# Patient Record
Sex: FEMALE | Race: WHITE | HISPANIC OR LATINO | Employment: UNEMPLOYED | URBAN - METROPOLITAN AREA
[De-identification: names, ages, dates, MRNs, and addresses within clinical notes are randomized per-mention and may not be internally consistent; named-entity substitution may affect disease eponyms.]

---

## 2017-08-01 PROBLEM — M79.7 FIBROMYALGIA: Status: ACTIVE | Noted: 2017-08-01

## 2017-08-01 PROBLEM — F41.9 ANXIETY: Status: ACTIVE | Noted: 2017-08-01

## 2018-03-06 ENCOUNTER — HISTORICAL (OUTPATIENT)
Dept: RADIOLOGY | Facility: HOSPITAL | Age: 53
End: 2018-03-06

## 2022-09-21 ENCOUNTER — HOSPITAL ENCOUNTER (OUTPATIENT)
Dept: RADIOLOGY | Facility: HOSPITAL | Age: 57
Discharge: HOME | End: 2022-09-21
Attending: NURSE PRACTITIONER
Payer: MEDICARE

## 2022-09-21 DIAGNOSIS — Z12.31 BREAST CANCER SCREENING BY MAMMOGRAM: ICD-10-CM

## 2022-09-21 PROCEDURE — 77063 BREAST TOMOSYNTHESIS BI: CPT | Mod: 26,,, | Performed by: RADIOLOGY

## 2022-09-21 PROCEDURE — 77067 SCR MAMMO BI INCL CAD: CPT | Mod: TC

## 2022-09-21 PROCEDURE — 77067 SCR MAMMO BI INCL CAD: CPT | Mod: 26,,, | Performed by: RADIOLOGY

## 2022-09-21 PROCEDURE — 77063 MAMMO DIGITAL SCREENING BILAT WITH TOMO: ICD-10-PCS | Mod: 26,,, | Performed by: RADIOLOGY

## 2022-09-21 PROCEDURE — 77067 MAMMO DIGITAL SCREENING BILAT WITH TOMO: ICD-10-PCS | Mod: 26,,, | Performed by: RADIOLOGY

## 2022-10-05 ENCOUNTER — HOSPITAL ENCOUNTER (OUTPATIENT)
Dept: RADIOLOGY | Facility: HOSPITAL | Age: 57
Discharge: HOME | End: 2022-10-05
Attending: NURSE PRACTITIONER
Payer: MEDICARE

## 2022-10-05 DIAGNOSIS — R92.8 ABNORMAL MAMMOGRAM: ICD-10-CM

## 2022-10-05 PROCEDURE — 76641 ULTRASOUND BREAST COMPLETE: CPT | Mod: TC,RT

## 2022-10-05 PROCEDURE — 76641 US BREAST RIGHT COMPLETE: ICD-10-PCS | Mod: 26,RT,, | Performed by: RADIOLOGY

## 2022-10-05 PROCEDURE — 77065 MAMMO DIGITAL DIAGNOSTIC RIGHT WITH TOMO: ICD-10-PCS | Mod: 26,RT,, | Performed by: RADIOLOGY

## 2022-10-05 PROCEDURE — 77061 BREAST TOMOSYNTHESIS UNI: CPT | Mod: 26,RT,, | Performed by: RADIOLOGY

## 2022-10-05 PROCEDURE — 77065 DX MAMMO INCL CAD UNI: CPT | Mod: TC,RT

## 2022-10-05 PROCEDURE — 76641 ULTRASOUND BREAST COMPLETE: CPT | Mod: 26,RT,, | Performed by: RADIOLOGY

## 2022-10-05 PROCEDURE — 77065 DX MAMMO INCL CAD UNI: CPT | Mod: 26,RT,, | Performed by: RADIOLOGY

## 2022-10-05 PROCEDURE — 77061 MAMMO DIGITAL DIAGNOSTIC RIGHT WITH TOMO: ICD-10-PCS | Mod: 26,RT,, | Performed by: RADIOLOGY

## 2022-10-24 ENCOUNTER — HOSPITAL ENCOUNTER (OUTPATIENT)
Dept: RADIOLOGY | Facility: HOSPITAL | Age: 57
Discharge: HOME | End: 2022-10-24
Attending: NURSE PRACTITIONER
Payer: MEDICARE

## 2022-10-24 DIAGNOSIS — R92.8 ABNORMAL MAMMOGRAM: Primary | ICD-10-CM

## 2022-10-24 PROCEDURE — 19081 MAMMO BREAST STEREOTACTIC BIOPSY 1ST SITE COMPLETE: ICD-10-PCS | Mod: RT,,, | Performed by: RADIOLOGY

## 2022-10-24 PROCEDURE — 27201044 MAMMO BREAST STEREOTACTIC BIOPSY 1ST SITE COMPLETE

## 2022-10-24 PROCEDURE — 19081 BX BREAST 1ST LESION STRTCTC: CPT | Mod: RT,,, | Performed by: RADIOLOGY

## 2022-10-24 PROCEDURE — 88305 TISSUE EXAM BY PATHOLOGIST: CPT

## 2022-10-24 PROCEDURE — 27000549 MAMMO BREAST STEREOTACTIC BIOPSY 1ST SITE COMPLETE

## 2022-10-26 DIAGNOSIS — N64.89 COMPLEX SCLEROSING LESION OF RIGHT BREAST: Primary | ICD-10-CM

## 2022-10-26 LAB
ESTROGEN SERPL-MCNC: NORMAL PG/ML
INSULIN SERPL-ACNC: NORMAL U[IU]/ML
LAB AP CLINICAL INFORMATION: NORMAL
LAB AP GROSS DESCRIPTION: NORMAL
LAB AP REPORT FOOTNOTES: NORMAL
T3RU NFR SERPL: NORMAL %

## 2022-11-10 ENCOUNTER — OFFICE VISIT (OUTPATIENT)
Dept: SURGERY | Facility: CLINIC | Age: 57
End: 2022-11-10
Payer: MEDICARE

## 2022-11-10 VITALS
TEMPERATURE: 98 F | SYSTOLIC BLOOD PRESSURE: 136 MMHG | DIASTOLIC BLOOD PRESSURE: 73 MMHG | OXYGEN SATURATION: 98 % | BODY MASS INDEX: 29.02 KG/M2 | RESPIRATION RATE: 20 BRPM | HEIGHT: 63 IN | HEART RATE: 86 BPM | WEIGHT: 163.81 LBS

## 2022-11-10 DIAGNOSIS — N64.89 COMPLEX SCLEROSING LESION OF RIGHT BREAST: ICD-10-CM

## 2022-11-10 DIAGNOSIS — Z01.818 PRE-OP TESTING: Primary | ICD-10-CM

## 2022-11-10 PROCEDURE — 1159F PR MEDICATION LIST DOCUMENTED IN MEDICAL RECORD: ICD-10-PCS | Mod: CPTII,S$GLB,, | Performed by: PHYSICIAN ASSISTANT

## 2022-11-10 PROCEDURE — 4010F PR ACE/ARB THEARPY RXD/TAKEN: ICD-10-PCS | Mod: CPTII,S$GLB,, | Performed by: PHYSICIAN ASSISTANT

## 2022-11-10 PROCEDURE — 3008F BODY MASS INDEX DOCD: CPT | Mod: CPTII,S$GLB,, | Performed by: PHYSICIAN ASSISTANT

## 2022-11-10 PROCEDURE — 3008F PR BODY MASS INDEX (BMI) DOCUMENTED: ICD-10-PCS | Mod: CPTII,S$GLB,, | Performed by: PHYSICIAN ASSISTANT

## 2022-11-10 PROCEDURE — 99999 PR PBB SHADOW E&M-EST. PATIENT-LVL V: CPT | Mod: PBBFAC,,, | Performed by: PHYSICIAN ASSISTANT

## 2022-11-10 PROCEDURE — 4010F ACE/ARB THERAPY RXD/TAKEN: CPT | Mod: CPTII,S$GLB,, | Performed by: PHYSICIAN ASSISTANT

## 2022-11-10 PROCEDURE — 99205 OFFICE O/P NEW HI 60 MIN: CPT | Mod: S$GLB,,, | Performed by: PHYSICIAN ASSISTANT

## 2022-11-10 PROCEDURE — 3078F PR MOST RECENT DIASTOLIC BLOOD PRESSURE < 80 MM HG: ICD-10-PCS | Mod: CPTII,S$GLB,, | Performed by: PHYSICIAN ASSISTANT

## 2022-11-10 PROCEDURE — 99205 PR OFFICE/OUTPT VISIT, NEW, LEVL V, 60-74 MIN: ICD-10-PCS | Mod: S$GLB,,, | Performed by: PHYSICIAN ASSISTANT

## 2022-11-10 PROCEDURE — 3075F SYST BP GE 130 - 139MM HG: CPT | Mod: CPTII,S$GLB,, | Performed by: PHYSICIAN ASSISTANT

## 2022-11-10 PROCEDURE — 3075F PR MOST RECENT SYSTOLIC BLOOD PRESS GE 130-139MM HG: ICD-10-PCS | Mod: CPTII,S$GLB,, | Performed by: PHYSICIAN ASSISTANT

## 2022-11-10 PROCEDURE — 1159F MED LIST DOCD IN RCRD: CPT | Mod: CPTII,S$GLB,, | Performed by: PHYSICIAN ASSISTANT

## 2022-11-10 PROCEDURE — 3078F DIAST BP <80 MM HG: CPT | Mod: CPTII,S$GLB,, | Performed by: PHYSICIAN ASSISTANT

## 2022-11-10 PROCEDURE — 99999 PR PBB SHADOW E&M-EST. PATIENT-LVL V: ICD-10-PCS | Mod: PBBFAC,,, | Performed by: PHYSICIAN ASSISTANT

## 2022-11-10 RX ORDER — TRAZODONE HYDROCHLORIDE 50 MG/1
25 TABLET ORAL NIGHTLY
COMMUNITY
Start: 2022-09-21

## 2022-11-10 RX ORDER — BUSPIRONE HYDROCHLORIDE 5 MG/1
5 TABLET ORAL 2 TIMES DAILY
COMMUNITY
Start: 2022-10-19

## 2022-11-10 RX ORDER — PRAZOSIN HYDROCHLORIDE 1 MG/1
CAPSULE ORAL
COMMUNITY
Start: 2022-09-21 | End: 2023-10-17

## 2022-11-10 RX ORDER — IRBESARTAN 300 MG/1
300 TABLET ORAL DAILY
COMMUNITY
Start: 2022-10-11

## 2022-11-10 RX ORDER — AMLODIPINE BESYLATE 10 MG/1
10 TABLET ORAL NIGHTLY
COMMUNITY
Start: 2022-10-14 | End: 2023-10-17

## 2022-11-10 RX ORDER — SODIUM CHLORIDE, SODIUM LACTATE, POTASSIUM CHLORIDE, CALCIUM CHLORIDE 600; 310; 30; 20 MG/100ML; MG/100ML; MG/100ML; MG/100ML
INJECTION, SOLUTION INTRAVENOUS CONTINUOUS
Status: CANCELLED | OUTPATIENT
Start: 2022-11-10

## 2022-11-10 NOTE — PROGRESS NOTES
[facility] Breast Surg  Breast Surgical Oncology  New Patient Office Visit - H&P    Referring Provider: Dr. Ilda Alexis  PCP: Tor Arango MD     Chief Complaint:   Chief Complaint   Patient presents with    Breast Mass     Patient referred for complex sclerosing lesion right breast, mild soreness after biopsy done on 10/24/22, no skin discoloration, no nipple discharge      Subjective:     HPI:  Elise Marshall is a 57 y.o. female who presents on 11/10/2022 for evaluation of  a complex sclerosing lesion of the right breast .    She currently denies any breast issues including rashes, redness, pain, swelling, nipple discharge, or new lumps/masses. She has a history of a benign right breast needle biopsy and lumpectomy in  at [facility].     Imagin2022 SCR MG - BIRADS 0: INCOMPLETE: The focal asymmetry with associated calcifications in the right breast which needs further evaluation.      10/5/2022 R DG MG and R Breast US - BIRADS 4: SUSPICIOUS: In the subareolar right breast, anterior to middle depth, there is a persistent focal asymmetry measuring up to 49 mm.  There are associated amorphous calcifications in the subareolar right breast, anterior to middle depth, which span 59 mm anterior to posterior.  These calcifications extend anteriorly to the base of the nipple.  These findings correspond to those recalled on the screening examination dated 2021.  In the 6:00 to 7:00 subareolar right breast, there is a focal change of echogenicity measuring up to 40 mm.  This corresponds in part to the mammographic finding. Right breast tomosynthesis/stereotactic guided biopsy is recommended.    Pathology:   10/26/2022 Right Breast Subareolar focal asymmetry with calcifications: Complex Sclerosing Lesion with microcalcifications and mild duct epithelial hyperplasia, usual type    OB/GYN History:  Age at Menarche Onset: 18  Menopausal Status: postmenopausal, LMP: Patient's last  menstrual period was 2016.  Hysterectomy/Oophorectomy: menopause, at age 52  Hormonal birth control (duration): 13 years  Pregnancy History:   Age at first live birth: 23  Hormone Replacement Therapy: No, none    Other:  # of breast biopsies (when and pathology results): 1, benign in 2007  MG breast density: No breast composition recorded.   Prior thoracic RT: none  Genetic testing: none  Ashkenazi Roman Catholic descent: No    Family History:  Family History   Problem Relation Age of Onset    Diabetes Mother     Hypertension Mother     Heart disease Mother     No Known Problems Father     Hypertension Brother     Hypertension Brother     Single kidney Brother     Mental illness Brother         Patient History:  Past Medical History:   Diagnosis Date    Anxiety     Arthritis     Depression     Fibromyalgia     GERD (gastroesophageal reflux disease)     Hyperlipidemia     Hypertension        Body mass index is 29.02 kg/m².     Past Surgical History:   Procedure Laterality Date    BREAST SURGERY Right      SECTION      TUBAL LIGATION      URETERAL STENT PLACEMENT         Social History     Socioeconomic History    Marital status: Single    Years of education: 12TH   Tobacco Use    Smoking status: Never    Smokeless tobacco: Never   Substance and Sexual Activity    Alcohol use: Yes     Comment: occ    Drug use: No    Sexual activity: Never         There is no immunization history on file for this patient.    Medications/Allergies:    Current Outpatient Medications:     ALPRAZolam (XANAX) 0.5 MG tablet, , Disp: , Rfl: 2    amLODIPine (NORVASC) 10 MG tablet, Take 10 mg by mouth every evening., Disp: , Rfl:     busPIRone (BUSPAR) 5 MG Tab, Take 5 mg by mouth 2 (two) times daily., Disp: , Rfl:     dextroamphetamine-amphetamine (ADDERALL XR) 15 MG 24 hr capsule, , Disp: , Rfl:     duloxetine (CYMBALTA) 30 MG capsule, Take 30 mg by mouth once daily. In addition to 60 mg (total 90 mg), Disp: , Rfl:     duloxetine  (CYMBALTA) 60 MG capsule, Take 60 mg by mouth once daily., Disp: , Rfl:     irbesartan (AVAPRO) 300 MG tablet, Take 300 mg by mouth once daily., Disp: , Rfl:     oxybutynin (DITROPAN-XL) 5 MG TR24, TAKE 1 TABLET BY MOUTH EVERY DAY, Disp: 30 tablet, Rfl: 0    pantoprazole (PROTONIX) 40 MG tablet, TK 1 T PO  QAM 30 MINUTES PRIOR TO BREAKFAST, Disp: , Rfl: 10    prazosin (MINIPRESS) 1 MG Cap, prn, Disp: , Rfl:     traZODone (DESYREL) 50 MG tablet, Take 25 mg by mouth every evening., Disp: , Rfl:      Review of patient's allergies indicates:   Allergen Reactions    Diclofenac Anaphylaxis       Review of Systems:  Review of Systems   Constitutional:  Positive for malaise/fatigue. Negative for chills, fever and weight loss.        Chronic, fibromyalgia     HENT:  Negative for congestion and sore throat.    Eyes:  Negative for blurred vision and double vision.   Respiratory:  Negative for cough, hemoptysis, shortness of breath and wheezing.    Cardiovascular:  Negative for chest pain, palpitations and leg swelling.   Gastrointestinal:  Positive for heartburn. Negative for abdominal pain, constipation, diarrhea, nausea and vomiting.   Genitourinary:  Positive for frequency. Negative for dysuria and hematuria.        Chronic - possible interstitial cystitis vs OAB   Musculoskeletal:  Positive for joint pain. Negative for falls and myalgias.        Chronic, fibromyalgia   Skin:  Negative for itching and rash.   Neurological:  Negative for dizziness, sensory change, focal weakness, seizures and headaches.   Endo/Heme/Allergies:  Negative for environmental allergies. Does not bruise/bleed easily.   Psychiatric/Behavioral:  Positive for depression. The patient is nervous/anxious.         Objective:     Vitals:  Vitals:    11/10/22 1417   BP: 136/73   Pulse: 86   Resp: 20   Temp: 97.9 °F (36.6 °C)       Physical Exam:  General: The patient is awake, alert and oriented times three. The patient is well nourished and in no acute  distress.  Neck: There is no evidence of palpable cervical, supraclavicular or axillary adenopathy. The neck is supple. The thyroid is not enlarged.  Musculoskeletal: The patient has a normal range of motion of her bilateral upper extremities.  Chest: Examination of the chest wall fails to reveal any obvious abnormalities. Nonlabored breathing, symmetric expansion.  Breast:  Right: Examination of right breast fails to reveal any dominant masses or areas of significant focal nodularity. The nipple is everted without evidence of discharge. There is no skin dimpling with movement of the pectoralis. There are no significant skin changes overlying the breast.   Left: Indention in the 6:00 position 1 cm from the areola related to scarring from needle insertion site during biopsy. Examination of the left breast fails to reveal any dominant masses or areas of significant focal nodularity. The nipple is everted without evidence of discharge. There is no skin dimpling with movement of the pectoralis. There are no significant skin changes overlying the breast.  Abdomen: The abdomen is soft, flat, nontender and nondistended.  Integumentary: no rashes or skin lesions present  Neurologic: cranial nerves intact, no signs of peripheral neurological deficit, motor/sensory function intact      Assessment and Plan:         Patient Active Problem List   Diagnosis    OAB (overactive bladder)    Hydronephrosis, right    Fibromyalgia    Anxiety        Elise was seen today for breast mass.    Diagnoses and all orders for this visit:    Complex sclerosing lesion of right breast  -     Ambulatory referral/consult to Breast Surgery     Radial scars, also called complex sclerosing lesions, are a pathologic diagnosis, usually discovered incidentally when a breast mass or radiologic abnormality is removed or biopsied. Occasionally, radial scars are large enough to be detected on mammography as suspicious spiculated masses, which cannot be  "reliably differentiated from spiculated carcinoma by imaging alone. Radial scars are characterized microscopically by a fibroelastic core with radiating ducts and lobules.    In general, surgical excision is recommended when radial scars or complex sclerosing lesions are diagnosed on core biopsy, based on series showing that 8 to 17 percent of surgical specimens at subsequent excision are positive for malignancy. The current recommendations from the American Society of Breast Surgeons state that most radial scars "should be excised, although imaging follow-up is reasonable for small, image-detected radial scars that are completely removed or well-sampled with large-gauge devices and in the setting of imaging-pathology concordance".    No additional treatment beyond excision is needed for radial scars. The risk of subsequent breast cancer after excision in this population is small, and chemoprevention is not indicated.      Plan:     Surgery recommended: Right Breast Excisional Biopsy with Seed Localization, tentatively 12/2/2022   - Pre-op Testing: CBC, BMP, EKG   - Surgical and social distancing guidelines discussed.          All of her questions were answered. She was advised to call if she develops any questions or concerns.    Nory Brito PA-C          --------------------------------------------------------------------------------------------------------------  Total time on the date of the visit ranged from 60-74 mins (85984). Total time includes both face-to-face and non-face-to-face time personally spent by myself on the day of the visit.    Non-face-to-face time included:  _X_ preparing to see the patient such as reviewing the patient record  _X_ obtaining and reviewing separately obtained history  _X_ independently interpreting results  _X_ documenting clinical information in electronic health record.    Face-to-face time included:  _X_ performing an appropriate history and examination  _X_ " communicating results to the patient  _X_ counseling and educating the patient  _x_ ordering appropriate medications  _x_ ordering appropriate tests  _X_ ordering appropriate procedures (including follow-up)  _X_ answering any questions the patient had    Total Time spent on date of visit: 60 minutes

## 2022-11-10 NOTE — H&P (VIEW-ONLY)
[facility] Breast Surg  Breast Surgical Oncology  New Patient Office Visit - H&P    Referring Provider: Dr. Ilda Alexis  PCP: Tor Arango MD     Chief Complaint:   Chief Complaint   Patient presents with    Breast Mass     Patient referred for complex sclerosing lesion right breast, mild soreness after biopsy done on 10/24/22, no skin discoloration, no nipple discharge      Subjective:     HPI:  Elise Marshall is a 57 y.o. female who presents on 11/10/2022 for evaluation of  a complex sclerosing lesion of the right breast .    She currently denies any breast issues including rashes, redness, pain, swelling, nipple discharge, or new lumps/masses. She has a history of a benign right breast needle biopsy and lumpectomy in  at [facility].     Imagin2022 SCR MG - BIRADS 0: INCOMPLETE: The focal asymmetry with associated calcifications in the right breast which needs further evaluation.      10/5/2022 R DG MG and R Breast US - BIRADS 4: SUSPICIOUS: In the subareolar right breast, anterior to middle depth, there is a persistent focal asymmetry measuring up to 49 mm.  There are associated amorphous calcifications in the subareolar right breast, anterior to middle depth, which span 59 mm anterior to posterior.  These calcifications extend anteriorly to the base of the nipple.  These findings correspond to those recalled on the screening examination dated 2021.  In the 6:00 to 7:00 subareolar right breast, there is a focal change of echogenicity measuring up to 40 mm.  This corresponds in part to the mammographic finding. Right breast tomosynthesis/stereotactic guided biopsy is recommended.    Pathology:   10/26/2022 Right Breast Subareolar focal asymmetry with calcifications: Complex Sclerosing Lesion with microcalcifications and mild duct epithelial hyperplasia, usual type    OB/GYN History:  Age at Menarche Onset: 18  Menopausal Status: postmenopausal, LMP: Patient's last  menstrual period was 2016.  Hysterectomy/Oophorectomy: menopause, at age 52  Hormonal birth control (duration): 13 years  Pregnancy History:   Age at first live birth: 23  Hormone Replacement Therapy: No, none    Other:  # of breast biopsies (when and pathology results): 1, benign in 2007  MG breast density: No breast composition recorded.   Prior thoracic RT: none  Genetic testing: none  Ashkenazi Anglican descent: No    Family History:  Family History   Problem Relation Age of Onset    Diabetes Mother     Hypertension Mother     Heart disease Mother     No Known Problems Father     Hypertension Brother     Hypertension Brother     Single kidney Brother     Mental illness Brother         Patient History:  Past Medical History:   Diagnosis Date    Anxiety     Arthritis     Depression     Fibromyalgia     GERD (gastroesophageal reflux disease)     Hyperlipidemia     Hypertension        Body mass index is 29.02 kg/m².     Past Surgical History:   Procedure Laterality Date    BREAST SURGERY Right      SECTION      TUBAL LIGATION      URETERAL STENT PLACEMENT         Social History     Socioeconomic History    Marital status: Single    Years of education: 12TH   Tobacco Use    Smoking status: Never    Smokeless tobacco: Never   Substance and Sexual Activity    Alcohol use: Yes     Comment: occ    Drug use: No    Sexual activity: Never         There is no immunization history on file for this patient.    Medications/Allergies:    Current Outpatient Medications:     ALPRAZolam (XANAX) 0.5 MG tablet, , Disp: , Rfl: 2    amLODIPine (NORVASC) 10 MG tablet, Take 10 mg by mouth every evening., Disp: , Rfl:     busPIRone (BUSPAR) 5 MG Tab, Take 5 mg by mouth 2 (two) times daily., Disp: , Rfl:     dextroamphetamine-amphetamine (ADDERALL XR) 15 MG 24 hr capsule, , Disp: , Rfl:     duloxetine (CYMBALTA) 30 MG capsule, Take 30 mg by mouth once daily. In addition to 60 mg (total 90 mg), Disp: , Rfl:     duloxetine  (CYMBALTA) 60 MG capsule, Take 60 mg by mouth once daily., Disp: , Rfl:     irbesartan (AVAPRO) 300 MG tablet, Take 300 mg by mouth once daily., Disp: , Rfl:     oxybutynin (DITROPAN-XL) 5 MG TR24, TAKE 1 TABLET BY MOUTH EVERY DAY, Disp: 30 tablet, Rfl: 0    pantoprazole (PROTONIX) 40 MG tablet, TK 1 T PO  QAM 30 MINUTES PRIOR TO BREAKFAST, Disp: , Rfl: 10    prazosin (MINIPRESS) 1 MG Cap, prn, Disp: , Rfl:     traZODone (DESYREL) 50 MG tablet, Take 25 mg by mouth every evening., Disp: , Rfl:      Review of patient's allergies indicates:   Allergen Reactions    Diclofenac Anaphylaxis       Review of Systems:  Review of Systems   Constitutional:  Positive for malaise/fatigue. Negative for chills, fever and weight loss.        Chronic, fibromyalgia     HENT:  Negative for congestion and sore throat.    Eyes:  Negative for blurred vision and double vision.   Respiratory:  Negative for cough, hemoptysis, shortness of breath and wheezing.    Cardiovascular:  Negative for chest pain, palpitations and leg swelling.   Gastrointestinal:  Positive for heartburn. Negative for abdominal pain, constipation, diarrhea, nausea and vomiting.   Genitourinary:  Positive for frequency. Negative for dysuria and hematuria.        Chronic - possible interstitial cystitis vs OAB   Musculoskeletal:  Positive for joint pain. Negative for falls and myalgias.        Chronic, fibromyalgia   Skin:  Negative for itching and rash.   Neurological:  Negative for dizziness, sensory change, focal weakness, seizures and headaches.   Endo/Heme/Allergies:  Negative for environmental allergies. Does not bruise/bleed easily.   Psychiatric/Behavioral:  Positive for depression. The patient is nervous/anxious.         Objective:     Vitals:  Vitals:    11/10/22 1417   BP: 136/73   Pulse: 86   Resp: 20   Temp: 97.9 °F (36.6 °C)       Physical Exam:  General: The patient is awake, alert and oriented times three. The patient is well nourished and in no acute  distress.  Neck: There is no evidence of palpable cervical, supraclavicular or axillary adenopathy. The neck is supple. The thyroid is not enlarged.  Musculoskeletal: The patient has a normal range of motion of her bilateral upper extremities.  Chest: Examination of the chest wall fails to reveal any obvious abnormalities. Nonlabored breathing, symmetric expansion.  Breast:  Right: Examination of right breast fails to reveal any dominant masses or areas of significant focal nodularity. The nipple is everted without evidence of discharge. There is no skin dimpling with movement of the pectoralis. There are no significant skin changes overlying the breast.   Left: Indention in the 6:00 position 1 cm from the areola related to scarring from needle insertion site during biopsy. Examination of the left breast fails to reveal any dominant masses or areas of significant focal nodularity. The nipple is everted without evidence of discharge. There is no skin dimpling with movement of the pectoralis. There are no significant skin changes overlying the breast.  Abdomen: The abdomen is soft, flat, nontender and nondistended.  Integumentary: no rashes or skin lesions present  Neurologic: cranial nerves intact, no signs of peripheral neurological deficit, motor/sensory function intact      Assessment and Plan:         Patient Active Problem List   Diagnosis    OAB (overactive bladder)    Hydronephrosis, right    Fibromyalgia    Anxiety        Elise was seen today for breast mass.    Diagnoses and all orders for this visit:    Complex sclerosing lesion of right breast  -     Ambulatory referral/consult to Breast Surgery     Radial scars, also called complex sclerosing lesions, are a pathologic diagnosis, usually discovered incidentally when a breast mass or radiologic abnormality is removed or biopsied. Occasionally, radial scars are large enough to be detected on mammography as suspicious spiculated masses, which cannot be  "reliably differentiated from spiculated carcinoma by imaging alone. Radial scars are characterized microscopically by a fibroelastic core with radiating ducts and lobules.    In general, surgical excision is recommended when radial scars or complex sclerosing lesions are diagnosed on core biopsy, based on series showing that 8 to 17 percent of surgical specimens at subsequent excision are positive for malignancy. The current recommendations from the American Society of Breast Surgeons state that most radial scars "should be excised, although imaging follow-up is reasonable for small, image-detected radial scars that are completely removed or well-sampled with large-gauge devices and in the setting of imaging-pathology concordance".    No additional treatment beyond excision is needed for radial scars. The risk of subsequent breast cancer after excision in this population is small, and chemoprevention is not indicated.      Plan:     Surgery recommended: Right Breast Excisional Biopsy with Seed Localization, tentatively 12/2/2022   - Pre-op Testing: CBC, BMP, EKG   - Surgical and social distancing guidelines discussed.          All of her questions were answered. She was advised to call if she develops any questions or concerns.    Nory Brito PA-C          --------------------------------------------------------------------------------------------------------------  Total time on the date of the visit ranged from 60-74 mins (69766). Total time includes both face-to-face and non-face-to-face time personally spent by myself on the day of the visit.    Non-face-to-face time included:  _X_ preparing to see the patient such as reviewing the patient record  _X_ obtaining and reviewing separately obtained history  _X_ independently interpreting results  _X_ documenting clinical information in electronic health record.    Face-to-face time included:  _X_ performing an appropriate history and examination  _X_ " communicating results to the patient  _X_ counseling and educating the patient  _x_ ordering appropriate medications  _x_ ordering appropriate tests  _X_ ordering appropriate procedures (including follow-up)  _X_ answering any questions the patient had    Total Time spent on date of visit: 60 minutes

## 2022-11-30 ENCOUNTER — HOSPITAL ENCOUNTER (OUTPATIENT)
Dept: RADIOLOGY | Facility: HOSPITAL | Age: 57
Discharge: HOME | End: 2022-11-30
Attending: SURGERY
Payer: MEDICARE

## 2022-11-30 DIAGNOSIS — N64.89 COMPLEX SCLEROSING LESION OF RIGHT BREAST: ICD-10-CM

## 2022-11-30 PROCEDURE — 19285 US RIGHT MAGSEED LOCALIZATION: ICD-10-PCS | Mod: RT,,, | Performed by: RADIOLOGY

## 2022-11-30 PROCEDURE — 77061 MAMMO DIGITAL DIAGNOSTIC RIGHT WITH TOMO: ICD-10-PCS | Mod: 26,RT,, | Performed by: RADIOLOGY

## 2022-11-30 PROCEDURE — 77065 MAMMO DIGITAL DIAGNOSTIC RIGHT WITH TOMO: ICD-10-PCS | Mod: 26,RT,, | Performed by: RADIOLOGY

## 2022-11-30 PROCEDURE — A4648 IMPLANTABLE TISSUE MARKER: HCPCS

## 2022-11-30 PROCEDURE — 77065 DX MAMMO INCL CAD UNI: CPT | Mod: 26,RT,, | Performed by: RADIOLOGY

## 2022-11-30 PROCEDURE — 77061 BREAST TOMOSYNTHESIS UNI: CPT | Mod: 26,RT,, | Performed by: RADIOLOGY

## 2022-11-30 PROCEDURE — 77065 DX MAMMO INCL CAD UNI: CPT | Mod: TC,RT

## 2022-11-30 PROCEDURE — 19285 PERQ DEV BREAST 1ST US IMAG: CPT | Mod: RT

## 2022-11-30 PROCEDURE — 19285 PERQ DEV BREAST 1ST US IMAG: CPT | Mod: RT,,, | Performed by: RADIOLOGY

## 2022-12-02 ENCOUNTER — HOSPITAL ENCOUNTER (OUTPATIENT)
Dept: RADIOLOGY | Facility: HOSPITAL | Age: 57
Discharge: HOME | End: 2022-12-02
Attending: SURGERY
Payer: MEDICARE

## 2022-12-02 ENCOUNTER — ANESTHESIA (OUTPATIENT)
Dept: SURGERY | Facility: HOSPITAL | Age: 57
End: 2022-12-02
Payer: MEDICARE

## 2022-12-02 ENCOUNTER — ANESTHESIA EVENT (OUTPATIENT)
Dept: SURGERY | Facility: HOSPITAL | Age: 57
End: 2022-12-02
Payer: MEDICARE

## 2022-12-02 ENCOUNTER — HOSPITAL ENCOUNTER (OUTPATIENT)
Facility: HOSPITAL | Age: 57
Discharge: HOME | End: 2022-12-02
Attending: SURGERY | Admitting: SURGERY
Payer: MEDICARE

## 2022-12-02 DIAGNOSIS — Z98.890 S/P LUMPECTOMY OF BREAST: Primary | ICD-10-CM

## 2022-12-02 DIAGNOSIS — N64.89 COMPLEX SCLEROSING LESION OF RIGHT BREAST: ICD-10-CM

## 2022-12-02 DIAGNOSIS — R92.8 ABNORMAL MAMMOGRAM: ICD-10-CM

## 2022-12-02 PROCEDURE — 63600175 PHARM REV CODE 636 W HCPCS: Performed by: PHYSICIAN ASSISTANT

## 2022-12-02 PROCEDURE — 63600175 PHARM REV CODE 636 W HCPCS: Performed by: SURGERY

## 2022-12-02 PROCEDURE — C1819 TISSUE LOCALIZATION-EXCISION: HCPCS | Performed by: SURGERY

## 2022-12-02 PROCEDURE — 63600175 PHARM REV CODE 636 W HCPCS: Performed by: STUDENT IN AN ORGANIZED HEALTH CARE EDUCATION/TRAINING PROGRAM

## 2022-12-02 PROCEDURE — 76098 X-RAY EXAM SURGICAL SPECIMEN: CPT | Mod: TC

## 2022-12-02 PROCEDURE — 25000003 PHARM REV CODE 250: Performed by: NURSE ANESTHETIST, CERTIFIED REGISTERED

## 2022-12-02 PROCEDURE — 88307 TISSUE EXAM BY PATHOLOGIST: CPT | Performed by: SURGERY

## 2022-12-02 PROCEDURE — 71000033 HC RECOVERY, INTIAL HOUR: Performed by: SURGERY

## 2022-12-02 PROCEDURE — 76098 MAMMO BREAST SPECIMEN: ICD-10-PCS | Mod: 26,,, | Performed by: STUDENT IN AN ORGANIZED HEALTH CARE EDUCATION/TRAINING PROGRAM

## 2022-12-02 PROCEDURE — 63600175 PHARM REV CODE 636 W HCPCS: Performed by: NURSE ANESTHETIST, CERTIFIED REGISTERED

## 2022-12-02 PROCEDURE — 27201423 OPTIME MED/SURG SUP & DEVICES STERILE SUPPLY: Performed by: SURGERY

## 2022-12-02 PROCEDURE — A4216 STERILE WATER/SALINE, 10 ML: HCPCS | Performed by: SURGERY

## 2022-12-02 PROCEDURE — 37000009 HC ANESTHESIA EA ADD 15 MINS: Performed by: SURGERY

## 2022-12-02 PROCEDURE — 25000003 PHARM REV CODE 250: Performed by: SURGERY

## 2022-12-02 PROCEDURE — C1886 CATHETER, ABLATION: HCPCS | Performed by: SURGERY

## 2022-12-02 PROCEDURE — 36000707: Performed by: SURGERY

## 2022-12-02 PROCEDURE — 71000015 HC POSTOP RECOV 1ST HR: Performed by: SURGERY

## 2022-12-02 PROCEDURE — 19125 PR EXCISE BREAST LES W XRAY MARKER: ICD-10-PCS | Mod: RT,,, | Performed by: SURGERY

## 2022-12-02 PROCEDURE — 76098 X-RAY EXAM SURGICAL SPECIMEN: CPT | Mod: 26,,, | Performed by: STUDENT IN AN ORGANIZED HEALTH CARE EDUCATION/TRAINING PROGRAM

## 2022-12-02 PROCEDURE — 36000706: Performed by: SURGERY

## 2022-12-02 PROCEDURE — 19125 EXCISION BREAST LESION: CPT | Mod: RT,,, | Performed by: SURGERY

## 2022-12-02 PROCEDURE — 37000008 HC ANESTHESIA 1ST 15 MINUTES: Performed by: SURGERY

## 2022-12-02 RX ORDER — SODIUM CHLORIDE 0.9 % (FLUSH) 0.9 %
SYRINGE (ML) INJECTION
Status: DISCONTINUED | OUTPATIENT
Start: 2022-12-02 | End: 2022-12-02 | Stop reason: HOSPADM

## 2022-12-02 RX ORDER — DIPHENHYDRAMINE HYDROCHLORIDE 50 MG/ML
25 INJECTION INTRAMUSCULAR; INTRAVENOUS EVERY 6 HOURS PRN
Status: DISCONTINUED | OUTPATIENT
Start: 2022-12-02 | End: 2022-12-02 | Stop reason: HOSPADM

## 2022-12-02 RX ORDER — ONDANSETRON HYDROCHLORIDE 2 MG/ML
INJECTION, SOLUTION INTRAMUSCULAR; INTRAVENOUS
Status: DISCONTINUED | OUTPATIENT
Start: 2022-12-02 | End: 2022-12-02

## 2022-12-02 RX ORDER — TRAMADOL HYDROCHLORIDE 50 MG/1
50 TABLET ORAL EVERY 4 HOURS PRN
Status: DISCONTINUED | OUTPATIENT
Start: 2022-12-02 | End: 2022-12-02 | Stop reason: HOSPADM

## 2022-12-02 RX ORDER — EPHEDRINE SULFATE 50 MG/ML
INJECTION, SOLUTION INTRAVENOUS
Status: DISCONTINUED | OUTPATIENT
Start: 2022-12-02 | End: 2022-12-02

## 2022-12-02 RX ORDER — MIDAZOLAM HYDROCHLORIDE 1 MG/ML
2 INJECTION INTRAMUSCULAR; INTRAVENOUS ONCE
Status: COMPLETED | OUTPATIENT
Start: 2022-12-02 | End: 2022-12-02

## 2022-12-02 RX ORDER — SODIUM CHLORIDE 9 MG/ML
INJECTION, SOLUTION INTRAMUSCULAR; INTRAVENOUS; SUBCUTANEOUS
Status: DISCONTINUED
Start: 2022-12-02 | End: 2022-12-02 | Stop reason: HOSPADM

## 2022-12-02 RX ORDER — CEFAZOLIN SODIUM 1 G/3ML
INJECTION, POWDER, FOR SOLUTION INTRAMUSCULAR; INTRAVENOUS
Status: DISCONTINUED | OUTPATIENT
Start: 2022-12-02 | End: 2022-12-02 | Stop reason: HOSPADM

## 2022-12-02 RX ORDER — BUPIVACAINE HYDROCHLORIDE 5 MG/ML
INJECTION, SOLUTION EPIDURAL; INTRACAUDAL
Status: DISCONTINUED | OUTPATIENT
Start: 2022-12-02 | End: 2022-12-02 | Stop reason: HOSPADM

## 2022-12-02 RX ORDER — CEFAZOLIN 2 G/1
INJECTION, POWDER, FOR SOLUTION INTRAMUSCULAR; INTRAVENOUS
Status: DISCONTINUED
Start: 2022-12-02 | End: 2022-12-02 | Stop reason: HOSPADM

## 2022-12-02 RX ORDER — TRAMADOL HYDROCHLORIDE 50 MG/1
50 TABLET ORAL EVERY 6 HOURS PRN
Qty: 28 TABLET | Refills: 0 | Status: SHIPPED | OUTPATIENT
Start: 2022-12-02 | End: 2022-12-09

## 2022-12-02 RX ORDER — CEFAZOLIN SODIUM 1 G/3ML
INJECTION, POWDER, FOR SOLUTION INTRAMUSCULAR; INTRAVENOUS
Status: DISCONTINUED
Start: 2022-12-02 | End: 2022-12-02 | Stop reason: HOSPADM

## 2022-12-02 RX ORDER — FENTANYL CITRATE 50 UG/ML
INJECTION, SOLUTION INTRAMUSCULAR; INTRAVENOUS
Status: DISCONTINUED | OUTPATIENT
Start: 2022-12-02 | End: 2022-12-02

## 2022-12-02 RX ORDER — PROCHLORPERAZINE EDISYLATE 5 MG/ML
5 INJECTION INTRAMUSCULAR; INTRAVENOUS EVERY 30 MIN PRN
Status: DISCONTINUED | OUTPATIENT
Start: 2022-12-02 | End: 2022-12-02 | Stop reason: HOSPADM

## 2022-12-02 RX ORDER — METOCLOPRAMIDE HYDROCHLORIDE 5 MG/ML
10 INJECTION INTRAMUSCULAR; INTRAVENOUS EVERY 10 MIN PRN
Status: DISCONTINUED | OUTPATIENT
Start: 2022-12-02 | End: 2022-12-02 | Stop reason: HOSPADM

## 2022-12-02 RX ORDER — CEFAZOLIN SODIUM 2 G/50ML
2 SOLUTION INTRAVENOUS
Status: COMPLETED | OUTPATIENT
Start: 2022-12-02 | End: 2022-12-02

## 2022-12-02 RX ORDER — PROPOFOL 10 MG/ML
VIAL (ML) INTRAVENOUS
Status: DISCONTINUED | OUTPATIENT
Start: 2022-12-02 | End: 2022-12-02

## 2022-12-02 RX ORDER — SODIUM CHLORIDE, SODIUM LACTATE, POTASSIUM CHLORIDE, CALCIUM CHLORIDE 600; 310; 30; 20 MG/100ML; MG/100ML; MG/100ML; MG/100ML
INJECTION, SOLUTION INTRAVENOUS CONTINUOUS
Status: DISCONTINUED | OUTPATIENT
Start: 2022-12-02 | End: 2022-12-02 | Stop reason: HOSPADM

## 2022-12-02 RX ORDER — ONDANSETRON 2 MG/ML
4 INJECTION INTRAMUSCULAR; INTRAVENOUS EVERY 12 HOURS PRN
Status: DISCONTINUED | OUTPATIENT
Start: 2022-12-02 | End: 2022-12-02 | Stop reason: HOSPADM

## 2022-12-02 RX ORDER — DEXAMETHASONE SODIUM PHOSPHATE 4 MG/ML
INJECTION, SOLUTION INTRA-ARTICULAR; INTRALESIONAL; INTRAMUSCULAR; INTRAVENOUS; SOFT TISSUE
Status: DISCONTINUED | OUTPATIENT
Start: 2022-12-02 | End: 2022-12-02

## 2022-12-02 RX ORDER — HYDROMORPHONE HYDROCHLORIDE 2 MG/ML
0.4 INJECTION, SOLUTION INTRAMUSCULAR; INTRAVENOUS; SUBCUTANEOUS EVERY 10 MIN PRN
Status: DISCONTINUED | OUTPATIENT
Start: 2022-12-02 | End: 2022-12-02 | Stop reason: HOSPADM

## 2022-12-02 RX ORDER — ACETAMINOPHEN 10 MG/ML
INJECTION, SOLUTION INTRAVENOUS
Status: DISCONTINUED | OUTPATIENT
Start: 2022-12-02 | End: 2022-12-02

## 2022-12-02 RX ORDER — MEPERIDINE HYDROCHLORIDE 25 MG/ML
12.5 INJECTION INTRAMUSCULAR; INTRAVENOUS; SUBCUTANEOUS EVERY 10 MIN PRN
Status: DISCONTINUED | OUTPATIENT
Start: 2022-12-02 | End: 2022-12-02 | Stop reason: HOSPADM

## 2022-12-02 RX ORDER — BUPIVACAINE HYDROCHLORIDE 5 MG/ML
INJECTION, SOLUTION EPIDURAL; INTRACAUDAL
Status: DISCONTINUED
Start: 2022-12-02 | End: 2022-12-02 | Stop reason: HOSPADM

## 2022-12-02 RX ADMIN — PROPOFOL 150 MG: 10 INJECTION, EMULSION INTRAVENOUS at 08:12

## 2022-12-02 RX ADMIN — EPHEDRINE SULFATE 20 MG: 50 INJECTION INTRAVENOUS at 09:12

## 2022-12-02 RX ADMIN — EPHEDRINE SULFATE 25 MG: 50 INJECTION INTRAVENOUS at 09:12

## 2022-12-02 RX ADMIN — ACETAMINOPHEN 1000 MG: 10 INJECTION, SOLUTION INTRAVENOUS at 09:12

## 2022-12-02 RX ADMIN — CEFAZOLIN SODIUM 2 G: 2 SOLUTION INTRAVENOUS at 08:12

## 2022-12-02 RX ADMIN — FENTANYL CITRATE 50 MCG: 50 INJECTION, SOLUTION INTRAMUSCULAR; INTRAVENOUS at 08:12

## 2022-12-02 RX ADMIN — SODIUM CHLORIDE, POTASSIUM CHLORIDE, SODIUM LACTATE AND CALCIUM CHLORIDE: 600; 310; 30; 20 INJECTION, SOLUTION INTRAVENOUS at 08:12

## 2022-12-02 RX ADMIN — EPHEDRINE SULFATE 10 MG: 50 INJECTION INTRAVENOUS at 09:12

## 2022-12-02 RX ADMIN — ONDANSETRON 4 MG: 2 INJECTION INTRAMUSCULAR; INTRAVENOUS at 10:12

## 2022-12-02 RX ADMIN — MIDAZOLAM HYDROCHLORIDE 2 MG: 1 INJECTION, SOLUTION INTRAMUSCULAR; INTRAVENOUS at 08:12

## 2022-12-02 RX ADMIN — DEXAMETHASONE SODIUM PHOSPHATE 4 MG: 4 INJECTION, SOLUTION INTRA-ARTICULAR; INTRALESIONAL; INTRAMUSCULAR; INTRAVENOUS; SOFT TISSUE at 09:12

## 2022-12-02 NOTE — OP NOTE
DATE OF PROCEDURE: 12/2/2022    SURGEON: Deya Lu M.D.    ASSISTANT:  Nory Brito PA-C    Medical Student: Mayra Winn MS-3    PREOPERATIVE DIAGNOSIS: Complex sclerosing lesion of right breast [N64.89]     POSTOPERATIVE DIAGNOSIS: Post-Op Diagnosis Codes:     * Complex sclerosing lesion of right breast [N64.89]     ANESTHESIA: General Anesthesiologist: Marck Ruiz MD  CRNA: Valerie Emmanuel CRNA     PROCEDURES PERFORMED:   1. Right breast excisional biopsy with MagSeed localization     EXCISION,MASS,BREAST,USING RADIOLOGICAL MARKER  Right:        PROCEDURE IN DETAIL:   Elise Marshall is a 57 y.o. female brought to the operating room for definitive surgical management of recent core biopsy revealing right breast complex sclerosing lesion. The patient has elected to undergo excisional biopsy for further evaluation. The patient was informed of the possible risks and complications of the procedure, including but not limited to anesthetic risks, bleeding, infection, and need for additional surgery. The patient concurred with the proposed plan, and has given informed consent. The site of surgery was properly noted/marked in the preoperative holding area.    The patient underwent informed consent. The MagSeed was placed in the  subareolar 6 o'clock   of the right breast. The MagSeed localization films were reviewed.    She was then brought to the Operating Room and placed in the supine position. Anesthesia was administered. The right breast, anterior chest, arm and axilla were then prepped and draped in a sterile fashion.     Attention was turned to the right breast itself. An incision was made in the  6 o'clock subareolar area  of the right breast over the anticipated tract of the seed. The specimen was dissected circumferentially around the seed and area of concern. The dissection was carried out circumferentially to include the seed. The specimen was completely dissected free. The seed localized  specimen was marked with sterile margin charms and submitted for specimen radiograph. Specimen radiograph confirmed the seed, clip and area of interest were within the specimen.    Within the lumpectomy cavity, hemostasis was achieved with cautery. The wound was irrigated until clear. There was no evidence of bleeding. It was closed in multiple layers with deep dermal and subcutaneous interrupted 3-0 Monocryl sutures and a running 4-0 Monocryl subcuticular skin closure. Dermabond was applied followed by sterile dressings.    All instruments and sponge counts were correct at the end of the procedure.     Significant Surgical Tasks Conducted by the Assistant(s), if Applicable: The skilled assistance of the Physician Assistant, Nory Brito PA-C, was necessary for the successful completion of this case. She was essential for proper positioning of the patient, manipulation of instruments, proper exposure, manipulation of tissue, and wound closure.       ESTIMATED BLOOD LOSS: 3 ml    Implants: * No implants in log *    Specimens:   Specimen (24h ago, onward)       Start     Ordered    12/02/22 0955  Specimen to Pathology  RELEASE UPON ORDERING        Comments: Specimen A: Right excisional breast biopsy; charms sienna margins    Specimen B: Right breast new margins; charms sienna new margins     References:    Click here for ordering Quick Tip   Question:  Release to patient  Answer:  Immediate    12/02/22 0954                            Condition: Good    Disposition: PACU - hemodynamically stable.    Attestation: I performed the procedure.

## 2022-12-02 NOTE — ANESTHESIA PREPROCEDURE EVALUATION
12/02/2022  Elise Marshall is a 57 y.o., female.    ECG nsr  CBC, CMP nl    Pre-op Assessment    I have reviewed the Patient Summary Reports.     I have reviewed the Nursing Notes. I have reviewed the NPO Status.   I have reviewed the Medications.     Review of Systems  Anesthesia Hx:   Denies Personal Hx of Anesthesia complications.   Social:  Non-Smoker    Hematology/Oncology:  Hematology Normal   Oncology Normal     EENT/Dental:EENT/Dental Normal   Cardiovascular:   Hypertension Denies MI.  Denies CAD.    no hyperlipidemia    Pulmonary:  Pulmonary Normal    Renal/:   Chronic Renal Disease (hx of hydronephrosis)    Hepatic/GI:   GERD, well controlled    Neurological:   Neuromuscular Disease, (fibromyalgia)    Endocrine:  Endocrine Normal    Psych:   Psychiatric History depression          Physical Exam  General: Well nourished, Cooperative and Alert    Airway:  Mallampati: II   Mouth Opening: Normal  TM Distance: Normal  Tongue: Normal    Dental:  Intact    Chest/Lungs:  Normal Respiratory Rate    Heart:  Rate: Normal  Rhythm: Regular Rhythm        Anesthesia Plan  Type of Anesthesia, risks & benefits discussed:    Anesthesia Type: Gen Supraglottic Airway  Intra-op Monitoring Plan: Standard ASA Monitors  Post Op Pain Control Plan: multimodal analgesia  Induction:  IV  Informed Consent: Informed consent signed with the Patient and all parties understand the risks and agree with anesthesia plan.  All questions answered.   ASA Score: 2  Day of Surgery Review of History & Physical: H&P Update referred to the surgeon/provider.    Ready For Surgery From Anesthesia Perspective.     .

## 2022-12-02 NOTE — ANESTHESIA POSTPROCEDURE EVALUATION
Anesthesia Post Evaluation    Patient: Elise Marshall    Procedure(s) Performed: Procedure(s) (LRB):  EXCISION,MASS,BREAST,USING RADIOLOGICAL MARKER  Right (Right)    Final Anesthesia Type: general      Patient location during evaluation: PACU  Patient participation: Yes- Able to Participate  Level of consciousness: awake and alert  Post-procedure vital signs: reviewed and stable  Pain management: adequate  Airway patency: patent    PONV status at discharge: No PONV  Anesthetic complications: no      Respiratory status: unassisted  Hydration status: euvolemic  Follow-up not needed.          Vitals Value Taken Time   /74 12/02/22 1127   Temp 36.3 °C (97.3 °F) 12/02/22 1023   Pulse 95 12/02/22 1127   Resp 19 12/02/22 1052   SpO2 97 % 12/02/22 1127   Vitals shown include unvalidated device data.      Event Time   Out of Recovery 10:53:24         Pain/Arti Score: Arti Score: 10 (12/2/2022 11:10 AM)

## 2022-12-02 NOTE — DISCHARGE INSTRUCTIONS
BREAST SURGERY POST-OP INSTRUCTIONS  DR. RENE TYLER PA-C     How do I care for my incisions?  You and your caregiver should look at your incision daily. Call your doctor if you see any redness or drainage from your incision.  You will be given a support bra, wear this for 24 hours a day (unless showering or sponge bathing) for comfort and to help decrease amount of swelling. If the bra fits too loose or too tight you may go to your local store a purchase a front opening sports bra that fits snug.   Your incision will be closed with sutures (stitches) under your skin. These sutures dissolve on their own, so they do not need to be removed.  · If you go home with Steri-StripsTM on your incision, they will loosen and fall off by themselves. If they havent fallen off within 14 days, you may remove them.  · If you go home with glue over your sutures (stitches), it will also loosen and peel off, similarly to the Steri-Strips.  ** If you have had a Mastectomy and/or Reconstruction and/or Axillary Lymph Node Dissection:  You may have 1-2 Nba-Lacey Drains in place. Please refer to the additional instructions discussing care of your drains.     Is it normal to feel new sensations?  As you are healing, you may feel a several different sensations in your breast. Tenderness, numbness, and twinges are common examples. These sensations usually come and go, and will lessen over time, usually within the first few months after surgery. As you continue to heal, you may feel scar tissue along your incision site. It will feel hard. This is common and will soften over the next several months.     Can I shower?  You can shower 24 hours after your surgery. Taking a warm shower is relaxing and can help decrease discomfort. Use soap when you shower and gently wash your incision. Pat the areas dry with a towel after showering, and leave your incision uncovered, unless you have drainage from your incision. If you  have drainage, call your doctors office.  Do not take tub baths, swim, or use hot tubs or saunas until you discuss it with your doctor at the first appointment after your surgery.    Will I have pain when I am home?  The length of time each person has pain or discomfort varies. You will be given a prescription for pain medication before you go home. Follow the guidelines below to manage your pain.  · Take your medication as directed and as needed.  · Call your doctor if the pain medication prescribed for you doesnt relieve your pain.  · Do not drive or drink alcohol while you are taking prescription pain medication.  · As your incision heals, you will have less pain and need less pain medication. A mild pain reliever such as acetaminophen (Tylenol) or ibuprofen (Advil) will relieve aches and discomfort. However, large quantities of acetaminophen may be harmful to your liver. Do not take more acetaminophen than the amount directed on the bottle or as instructed by your doctor or nurse.  · Pain medication should help you as you resume your normal activities. Pain medication is most effective 30 to 45 minutes after taking it.  · Keep track of when you take your pain medication. Taking it when your pain first begins is more effective than waiting for the pain to get worse.  Pain medication may cause constipation (having fewer bowel movements than what is normal for you).    How can I prevent constipation?  · Go to the bathroom at the same time every day. Your body will get used to going at that time.  · If you feel the urge to go, do not put it off. Try to use the bathroom 5 to 15 minutes after meals.  · After breakfast is a good time to move your bowels because the reflexes in your colon are strongest then.  · Exercise if you can; walking is an excellent form of exercise.  · Drink 8 (8-ounce) glasses (2 liters) of liquids daily, if you can. Drink water, juices, soups, ice cream shakes, and other drinks that do not  have caffeine. Beverages with caffeine, such as coffee and soda, pull fluid out of the body.  · Slowly increase the fiber in your diet to 25 to 35 grams per day. Fruits, vegetables, whole grains, and cereals contain fiber. If you have an ostomy or have had recent bowel surgery, check with your doctor or nurse before making any changes in your diet.  · Both over-the-counter and prescription medications are available to treat constipation. Start with 1 of the following over-the-counter medications first:  o Docusate sodium (Colace®) 100 mg. Take __1___ capsules __1___ time a day. This is a stool softener that causes few side effects. Do not take it with mineral oil.  o Polyethylene glycol (MiraLAX®) 17 grams daily.  o Senna (Senokot®) 2 tablets at bedtime. This is a stimulant laxative, which can cause cramping.  · If you havent had a bowel movement in 2 days, call your doctor or nurse.    Will I be able to eat?  You can resume eating when you go home after surgery. Eating a balanced diet high in protein will help you heal after surgery. Your diet should include a healthy protein source at each meal, as well as fruits, vegetables, and whole grains. If you have questions about your diet, ask to see a dietitian.    When is it safe for me to drive and what activities can I perform?  You may resume driving after surgery as long as you are not taking prescription pain medication that may make you drowsy, and you have your full range of motion.  You should not lift anything heavier than 10-15 lbs the first week. After this time, you may gradually increase the amount of weight. You want to take it easy the first 2 weeks, no strenuous or repetitive movements such as vacuuming or scrubbing. Walking is okay. Ask the doctor if you have questions.    How long until I have the pathology results?  The pathology report usually takes to 7 to 10 business days.    When is my first appointment after my surgery?  You should be given or  schedule a follow-up appointment 1 to 2 weeks after your surgery.    How can I cope with my feelings?   After surgery for a serious illness, you may have new and upsetting feelings. Many patients say they felt sad, worried, nervous, irritable, or angry at one time or another. You may find that you cannot control some of these feelings. If this happens, its a good idea to seek emotional support.  The first step in coping is to talk about how you feel. Family and friends can help. Your nurse, doctor, and  can reassure, support, and guide you. It is always a good idea to let these professionals know how you, your family, and your friends are feeling emotionally. Many resources are available to patients and their families. Whether you are in the hospital or at home, we are here to help you and your family and friends handle the emotional aspects of your illness.    What if I have other questions?  If you have any questions or concerns, please talk with your doctor or nurse. You can reach them Monday through Thursday from 9:00 AM to 5:00 PM and Friday from 9:00 AM to 12:00 PM at [phone].  After 5:00 PM M-Th or 12:00 PM Fri, during the weekend, and on holidays, please call [phone] and ask for the doctor on call.    PLEASE CALL YOUR DOCTOR OR NURSE IF YOU HAVE:  · A temperature of 101° F (38.3° C) or higher  · Shortness of breath  · Warmer than normal skin around your incision  · Increased discomfort in the area  · Increased redness around your incision  · New or increased swelling around your incision  · Discharge from your incision

## 2022-12-02 NOTE — BRIEF OP NOTE
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]    Procedure(s) (LRB):  EXCISION,MASS,BREAST,USING RADIOLOGICAL MARKER  Right (Right)    Anesthesia: General    Findings/Key Components: Removal of right breast tissue with complex sclerosing lesion for pathology evaluation    Estimated Blood Loss: 3 ml         Specimens:   Specimen (24h ago, onward)       Start     Ordered    12/02/22 0955  Specimen to Pathology  RELEASE UPON ORDERING        Comments: Specimen A: Right excisional breast biopsy; charms sienna margins    Specimen B: Right breast new margins; charms sienna new margins     References:    Click here for ordering Quick Tip   Question:  Release to patient  Answer:  Immediate    12/02/22 0955                    Discharge Note    SUMMARY     Admit Date: 12/2/2022    Discharge Date and Time:  12/02/2022 10:06 AM    Hospital Course (synopsis of major diagnoses, care, treatment, and services provided during the course of the hospital stay): She is status post right breast excisional biopsy     Final Diagnosis: Post-Op Diagnosis Codes:     * 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    Specialty: Physician Assistant  Why: 12/13/2022 11:20 AM  Contact information:  [address] [place] [address] B  [facility] [address]  [phone]                             Medications:  Reconciled Home Medications:      Medication List        START taking these medications      traMADoL 50 mg tablet  Commonly known as: ULTRAM  Take 1 tablet (50 mg total) by mouth every 6  (six) hours as needed for Pain.            CONTINUE taking these medications      ALPRAZolam 0.5 MG tablet  Commonly known as: XANAX     amLODIPine 10 MG tablet  Commonly known as: NORVASC  Take 10 mg by mouth every evening.     busPIRone 5 MG Tab  Commonly known as: BUSPAR  Take 5 mg by mouth 2 (two) times daily.     dextroamphetamine-amphetamine 15 MG 24 hr capsule  Commonly known as: ADDERALL XR     * DULoxetine 60 MG capsule  Commonly known as: CYMBALTA  Take 60 mg by mouth once daily.     * DULoxetine 30 MG capsule  Commonly known as: CYMBALTA  Take 30 mg by mouth once daily. In addition to 60 mg (total 90 mg)     irbesartan 300 MG tablet  Commonly known as: AVAPRO  Take 300 mg by mouth once daily.     oxybutynin 5 MG Tr24  Commonly known as: DITROPAN-XL  TAKE 1 TABLET BY MOUTH EVERY DAY     pantoprazole 40 MG tablet  Commonly known as: PROTONIX  TK 1 T PO  QAM 30 MINUTES PRIOR TO BREAKFAST     prazosin 1 MG Cap  Commonly known as: MINIPRESS  prn     traZODone 50 MG tablet  Commonly known as: DESYREL  Take 25 mg by mouth every evening.           * This list has 2 medication(s) that are the same as other medications prescribed for you. Read the directions carefully, and ask your doctor or other care provider to review them with you.                Discharge Procedure Orders   Diet general     Ice to affected area     Lifting restrictions     Remove dressing in 24 hours   Order Comments: Leave glue and steri strips until clinic visit     Call MD for:  temperature >100.4     Call MD for:  persistent nausea and vomiting     Call MD for:  severe uncontrolled pain     Call MD for:  difficulty breathing, headache or visual disturbances     Call MD for:  redness, tenderness, or signs of infection (pain, swelling, redness, odor or green/yellow discharge around incision site)     Call MD for:  hives     Call MD for:  persistent dizziness or light-headedness      Follow-up Information       DARRELL Mitchell Follow  up.    Specialty: Physician Assistant  Why: 12/13/2022 11:20 AM  Contact information:  [address] [place] [address] B  [facility] [address]  [phone]

## 2022-12-02 NOTE — TRANSFER OF CARE
"Anesthesia Transfer of Care Note    Patient: Elise Marshall    Procedure(s) Performed: Procedure(s) (LRB):  EXCISION,MASS,BREAST,USING RADIOLOGICAL MARKER  Right (Right)    Patient location: PACU    Anesthesia Type: general    Transport from OR: Transported from OR on room air with adequate spontaneous ventilation    Post pain: adequate analgesia    Post assessment: no apparent anesthetic complications    Post vital signs: stable    Level of consciousness: sedated    Nausea/Vomiting: no nausea/vomiting    Complications: none    Transfer of care protocol was followed      Last vitals:   Visit Vitals  BP (!) 143/80   Pulse 68   Temp 36.6 °C (97.9 °F) (Oral)   Resp 18   Ht 5' 3" (1.6 m)   Wt 74.8 kg (165 lb)   LMP 05/01/2016   SpO2 99%   Breastfeeding No   BMI 29.23 kg/m²     "

## 2022-12-02 NOTE — PLAN OF CARE
VSS, karen score 10, pt arousable and ready for transfer to [facility] per Dr Ruiz.

## 2022-12-02 NOTE — INTERVAL H&P NOTE
The patient has been examined and the H&P has been reviewed:    I concur with the findings and no changes have occurred since H&P was written.    Surgery risks, benefits and alternative options discussed and understood by patient/family.        All of questions were answered    Deya Lu MD  Breast Surgical Oncology

## 2022-12-03 VITALS
TEMPERATURE: 97 F | DIASTOLIC BLOOD PRESSURE: 74 MMHG | WEIGHT: 165 LBS | HEART RATE: 95 BPM | HEIGHT: 63 IN | OXYGEN SATURATION: 97 % | BODY MASS INDEX: 29.23 KG/M2 | SYSTOLIC BLOOD PRESSURE: 137 MMHG | RESPIRATION RATE: 21 BRPM

## 2022-12-07 ENCOUNTER — TELEPHONE (OUTPATIENT)
Dept: SURGERY | Facility: CLINIC | Age: 57
End: 2022-12-07
Payer: MEDICARE

## 2022-12-07 NOTE — PROGRESS NOTES
[facility] - Breast [place] [facility]  Breast Surgical Oncology  New Patient Office Visit - H&P    Referring Provider: Dr. Ilda Alexis  PCP: Tor Arango MD     Chief Complaint:   Chief Complaint   Patient presents with    Post-op Evaluation     Patient complain of throbbing and one episode this morning of sharp pain in right breast when she changed her bra, using ice, supportive bra, and taking Tramadol with good relief        Subjective:     Treatments:  Right excisional breast biopsy 2022    Interval History:  2022 - Elise Marshall returns today for post-op. She is s/p right excisional breast biosy. Surgical pathology revealed a complex sclerosing lesion with no evidence of malignancy or atypia. Small fibroadenoma also present. She has had little to no pain since surgery except this morning. She does report that she mopped her house yesterday and discontinued bra wear for the last 2 nights.    HPI:  Elise Marshall is a 57 y.o. female who presents on 11/10/2022 for evaluation of  a complex sclerosing lesion of the right breast .    She currently denies any breast issues including rashes, redness, pain, swelling, nipple discharge, or new lumps/masses. She has a history of a benign right breast needle biopsy and lumpectomy in  at [facility].     Imagin2022 SCR MG - BIRADS 0: INCOMPLETE: The focal asymmetry with associated calcifications in the right breast which needs further evaluation.      10/5/2022 R DG MG and R Breast US - BIRADS 4: SUSPICIOUS: In the subareolar right breast, anterior to middle depth, there is a persistent focal asymmetry measuring up to 49 mm.  There are associated amorphous calcifications in the subareolar right breast, anterior to middle depth, which span 59 mm anterior to posterior.  These calcifications extend anteriorly to the base of the nipple.  These findings correspond to those recalled on the screening examination dated 2021.  In the 6:00 to  7:00 subareolar right breast, there is a focal change of echogenicity measuring up to 40 mm.  This corresponds in part to the mammographic finding. Right breast tomosynthesis/stereotactic guided biopsy is recommended.    Pathology:  10/26/2022 Right Breast Subareolar focal asymmetry with calcifications: Complex Sclerosing Lesion with microcalcifications and mild duct epithelial hyperplasia, usual type    2022 Right Excisional Breast Biopsy - complex sclerosing lesion/radial scar; no evidence of malignancy or atypia.  Changes consistent with prior biopsy site.  Focal fibrocystic changes.  New margin excision reveals small fibroadenoma with sclerosing adenosis.  No atypia.    OB/GYN History:  Age at Menarche Onset: 18  Menopausal Status: postmenopausal, LMP: Patient's last menstrual period was 2016.  Hysterectomy/Oophorectomy: menopause, at age 52  Hormonal birth control (duration): 13 years  Pregnancy History:   Age at first live birth: 23  Hormone Replacement Therapy: No, none    Other:  # of breast biopsies (when and pathology results): 1, benign in   MG breast density: Category B  Prior thoracic RT: none  Genetic testing: none  Ashkenazi Latter day descent: No    Family History:  Family History   Problem Relation Age of Onset    Diabetes Mother     Hypertension Mother     Heart disease Mother     No Known Problems Father     Hypertension Brother     Hypertension Brother     Single kidney Brother     Mental illness Brother         Patient History:  Past Medical History:   Diagnosis Date    Anxiety     Arthritis     Complex sclerosing lesion of right breast     Depression     Fibromyalgia     GERD (gastroesophageal reflux disease)     Hyperlipidemia     Hypertension     MAYELA (obstructive sleep apnea)        Body mass index is 29.3 kg/m².     Past Surgical History:   Procedure Laterality Date    BREAST SURGERY Right      SECTION      EXCISION, MASS, BREAST, USING RADIOLOGICAL MARKER Right  12/2/2022    Procedure: EXCISION,MASS,BREAST,USING RADIOLOGICAL MARKER  Right;  Surgeon: Deya Lu MD;  Location: [facility];  Service: General;  Laterality: Right;    TUBAL LIGATION      URETERAL STENT PLACEMENT         Social History     Socioeconomic History    Marital status: Single    Years of education: 12TH   Tobacco Use    Smoking status: Never    Smokeless tobacco: Never   Substance and Sexual Activity    Alcohol use: Yes     Comment: occ    Drug use: No    Sexual activity: Never         There is no immunization history on file for this patient.    Medications/Allergies:    Current Outpatient Medications:     ALPRAZolam (XANAX) 0.5 MG tablet, , Disp: , Rfl: 2    amLODIPine (NORVASC) 10 MG tablet, Take 10 mg by mouth every evening., Disp: , Rfl:     busPIRone (BUSPAR) 5 MG Tab, Take 5 mg by mouth 2 (two) times daily., Disp: , Rfl:     dextroamphetamine-amphetamine (ADDERALL XR) 15 MG 24 hr capsule, , Disp: , Rfl:     duloxetine (CYMBALTA) 30 MG capsule, Take 30 mg by mouth once daily. In addition to 60 mg (total 90 mg), Disp: , Rfl:     duloxetine (CYMBALTA) 60 MG capsule, Take 60 mg by mouth once daily., Disp: , Rfl:     irbesartan (AVAPRO) 300 MG tablet, Take 300 mg by mouth once daily., Disp: , Rfl:     oxybutynin (DITROPAN-XL) 5 MG TR24, TAKE 1 TABLET BY MOUTH EVERY DAY, Disp: 30 tablet, Rfl: 0    pantoprazole (PROTONIX) 40 MG tablet, TK 1 T PO  QAM 30 MINUTES PRIOR TO BREAKFAST, Disp: , Rfl: 10    prazosin (MINIPRESS) 1 MG Cap, prn, Disp: , Rfl:     traMADoL (ULTRAM) 50 mg tablet, Take 50 mg by mouth every 6 (six) hours., Disp: , Rfl:     traZODone (DESYREL) 50 MG tablet, Take 25 mg by mouth every evening., Disp: , Rfl:      Review of patient's allergies indicates:   Allergen Reactions    Diclofenac Anaphylaxis       Review of Systems:  Review of Systems   Constitutional:  Positive for malaise/fatigue. Negative for chills, fever and weight loss.        Chronic, fibromyalgia     HENT:  Negative for  congestion and sore throat.    Eyes:  Negative for blurred vision and double vision.   Respiratory:  Negative for cough, hemoptysis, shortness of breath and wheezing.    Cardiovascular:  Negative for chest pain, palpitations and leg swelling.   Gastrointestinal:  Positive for heartburn. Negative for abdominal pain, constipation, diarrhea, nausea and vomiting.   Genitourinary:  Positive for frequency. Negative for dysuria and hematuria.        Chronic - possible interstitial cystitis vs OAB   Musculoskeletal:  Positive for joint pain. Negative for falls and myalgias.        Chronic, fibromyalgia   Skin:  Negative for itching and rash.   Neurological:  Negative for dizziness, sensory change, focal weakness, seizures and headaches.   Endo/Heme/Allergies:  Negative for environmental allergies. Does not bruise/bleed easily.   Psychiatric/Behavioral:  Positive for depression. The patient is nervous/anxious.         Objective:     Vitals:  Vitals:    12/13/22 1126   BP: (!) 155/76   Pulse: 84   Resp: 20   Temp: 97.7 °F (36.5 °C)       Body mass index is 29.3 kg/m².    Physical Exam:  General: The patient is awake, alert and oriented times three. The patient is well nourished and in no acute distress.    Musculoskeletal: The patient has a normal range of motion of her bilateral upper extremities.    Breast: The incision is healing well. No tenderness, swelling, or redness.   Integumentary: no rashes or skin lesions present  Neurologic: cranial nerves intact, no signs of peripheral neurological deficit, motor/sensory function intact      Assessment and Plan:         Patient Active Problem List   Diagnosis    OAB (overactive bladder)    Hydronephrosis, right    Fibromyalgia    Anxiety        Elise was seen today for post-op evaluation.    Diagnoses and all orders for this visit:    Complex sclerosing lesion of right breast    Screening mammogram, encounter for  -     Mammo Digital Screening Bilat w/ [facility]; Future              Plan:     Return to annual screening, next due 9/2023. RTC after MG for clinical breast exam. If next screening and clinical exam are benign/negative, will release care to referring provider.    Healthy lifestyle guidelines were reviewed. She was encouraged to engage in regular exercise, maintain a healthy body weight, and avoid excessive alcohol consumption. Healthy nutritional guidelines were also discussed. Self-breast examination was reviewed with the patient in detail and she was encouraged to perform this on a monthly basis.    Post op pain - Discussed continuing to wear supportive bra (both day and night) and avoiding heavy lifting/repetitive arm motions (such as mopping/sweeping/stirring) for another week. She may then return to activities as tolerated. May ice the breast.         All of her questions were answered. She was advised to call if she develops any questions or concerns.    Nory Brito PA-C      OFFICE VISIT CODING: Post-Op

## 2022-12-07 NOTE — TELEPHONE ENCOUNTER
Patient called with her pathology results.      ----- Message from Deya Lu MD sent at 12/6/2022  7:52 PM CST -----  Please call the patient and inform pathology results revealed benign findings and no evidence of cancer. Further discussion will be had at their post-op/follow-up appointment.

## 2022-12-13 ENCOUNTER — OFFICE VISIT (OUTPATIENT)
Dept: SURGERY | Facility: CLINIC | Age: 57
End: 2022-12-13
Payer: MEDICARE

## 2022-12-13 VITALS
TEMPERATURE: 98 F | SYSTOLIC BLOOD PRESSURE: 155 MMHG | HEART RATE: 84 BPM | OXYGEN SATURATION: 99 % | WEIGHT: 165.38 LBS | RESPIRATION RATE: 20 BRPM | BODY MASS INDEX: 29.3 KG/M2 | HEIGHT: 63 IN | DIASTOLIC BLOOD PRESSURE: 76 MMHG

## 2022-12-13 DIAGNOSIS — N64.89 COMPLEX SCLEROSING LESION OF RIGHT BREAST: Primary | ICD-10-CM

## 2022-12-13 DIAGNOSIS — Z12.31 SCREENING MAMMOGRAM, ENCOUNTER FOR: ICD-10-CM

## 2022-12-13 PROCEDURE — 99999 PR PBB SHADOW E&M-EST. PATIENT-LVL III: ICD-10-PCS | Mod: PBBFAC,,, | Performed by: PHYSICIAN ASSISTANT

## 2022-12-13 PROCEDURE — 99999 PR PBB SHADOW E&M-EST. PATIENT-LVL III: CPT | Mod: PBBFAC,,, | Performed by: PHYSICIAN ASSISTANT

## 2022-12-13 PROCEDURE — 3008F PR BODY MASS INDEX (BMI) DOCUMENTED: ICD-10-PCS | Mod: CPTII,S$GLB,, | Performed by: PHYSICIAN ASSISTANT

## 2022-12-13 PROCEDURE — 3078F PR MOST RECENT DIASTOLIC BLOOD PRESSURE < 80 MM HG: ICD-10-PCS | Mod: CPTII,S$GLB,, | Performed by: PHYSICIAN ASSISTANT

## 2022-12-13 PROCEDURE — 99024 PR POST-OP FOLLOW-UP VISIT: ICD-10-PCS | Mod: S$GLB,,, | Performed by: PHYSICIAN ASSISTANT

## 2022-12-13 PROCEDURE — 3078F DIAST BP <80 MM HG: CPT | Mod: CPTII,S$GLB,, | Performed by: PHYSICIAN ASSISTANT

## 2022-12-13 PROCEDURE — 3008F BODY MASS INDEX DOCD: CPT | Mod: CPTII,S$GLB,, | Performed by: PHYSICIAN ASSISTANT

## 2022-12-13 PROCEDURE — 99024 POSTOP FOLLOW-UP VISIT: CPT | Mod: S$GLB,,, | Performed by: PHYSICIAN ASSISTANT

## 2022-12-13 PROCEDURE — 3077F SYST BP >= 140 MM HG: CPT | Mod: CPTII,S$GLB,, | Performed by: PHYSICIAN ASSISTANT

## 2022-12-13 PROCEDURE — 4010F PR ACE/ARB THEARPY RXD/TAKEN: ICD-10-PCS | Mod: CPTII,S$GLB,, | Performed by: PHYSICIAN ASSISTANT

## 2022-12-13 PROCEDURE — 4010F ACE/ARB THERAPY RXD/TAKEN: CPT | Mod: CPTII,S$GLB,, | Performed by: PHYSICIAN ASSISTANT

## 2022-12-13 PROCEDURE — 3077F PR MOST RECENT SYSTOLIC BLOOD PRESSURE >= 140 MM HG: ICD-10-PCS | Mod: CPTII,S$GLB,, | Performed by: PHYSICIAN ASSISTANT

## 2022-12-13 RX ORDER — TRAMADOL HYDROCHLORIDE 50 MG/1
50 TABLET ORAL EVERY 6 HOURS
COMMUNITY
End: 2023-10-17

## 2023-09-28 ENCOUNTER — HOSPITAL ENCOUNTER (OUTPATIENT)
Dept: RADIOLOGY | Facility: HOSPITAL | Age: 58
Discharge: HOME | End: 2023-09-28
Attending: PHYSICIAN ASSISTANT
Payer: MEDICARE

## 2023-09-28 DIAGNOSIS — Z12.31 SCREENING MAMMOGRAM, ENCOUNTER FOR: ICD-10-CM

## 2023-09-28 PROCEDURE — 77067 SCR MAMMO BI INCL CAD: CPT | Mod: 26,,, | Performed by: RADIOLOGY

## 2023-09-28 PROCEDURE — 77063 MAMMO DIGITAL SCREENING BILAT WITH TOMO: ICD-10-PCS | Mod: 26,,, | Performed by: RADIOLOGY

## 2023-09-28 PROCEDURE — 77067 SCR MAMMO BI INCL CAD: CPT | Mod: TC

## 2023-09-28 PROCEDURE — 77063 BREAST TOMOSYNTHESIS BI: CPT | Mod: 26,,, | Performed by: RADIOLOGY

## 2023-09-28 PROCEDURE — 77067 MAMMO DIGITAL SCREENING BILAT WITH TOMO: ICD-10-PCS | Mod: 26,,, | Performed by: RADIOLOGY

## 2023-10-16 NOTE — PROGRESS NOTES
[facility] - [facility] Breast [facility]  Breast Surgical Oncology  Follow Up Patient Office Visit - H&P    Referring Provider: Dr. Ilda Alexis  PCP: Tor Arango MD     Chief Complaint:   Chief Complaint   Patient presents with    Follow-up     Patient is present for a follow up visit for CBE and Imaging results. Patient is well, no complaints of any breast related issues today.     Subjective:     Treatments:  Right excisional breast biopsy 2022    Interval History:  10/17/2023 - Elise Marshall returns today for follow up after SCR MG, which revealed benign findings in the bilateral breasts. There are interval benign post surgical changes noted to the right breast. She has no breast concerns. She has no breast concerns.    HPI:  Elise Marshall is a 58 y.o. female who presents on 11/10/2022 for evaluation of  a complex sclerosing lesion of the right breast . She is s/p right excisional breast biosy. Surgical pathology revealed a complex sclerosing lesion with no evidence of malignancy or atypia. Small fibroadenoma also present. She has had little to no pain since surgery except this morning. She does report that she mopped her house yesterday and discontinued bra wear for the last 2 nights.    She currently denies any breast issues including rashes, redness, pain, swelling, nipple discharge, or new lumps/masses. She has a history of a benign right breast needle biopsy and lumpectomy in  at [facility].     Imagin2022 SCR MG - BIRADS 0: INCOMPLETE: The focal asymmetry with associated calcifications in the right breast which needs further evaluation.      10/5/2022 R DG MG and R Breast US - BIRADS 4: SUSPICIOUS: In the subareolar right breast, anterior to middle depth, there is a persistent focal asymmetry measuring up to 49 mm.  There are associated amorphous calcifications in the subareolar right breast, anterior to middle depth, which span 59 mm anterior to posterior.  These  calcifications extend anteriorly to the base of the nipple.  These findings correspond to those recalled on the screening examination dated 2021.  In the 6:00 to 7:00 subareolar right breast, there is a focal change of echogenicity measuring up to 40 mm.  This corresponds in part to the mammographic finding. Right breast tomosynthesis/stereotactic guided biopsy is recommended. (See biopsy results below)    2023 SCR MG s/p right breast excisional biopsy on 2022 - There is no mammographic evidence of malignancy. Interval benign post surgical changes are noted in the right breast. There is a biopsy clip in the right breast denoting prior benign biopsy.    Pathology:  10/26/2022 Right Breast Subareolar focal asymmetry with calcifications: Complex Sclerosing Lesion with microcalcifications and mild duct epithelial hyperplasia, usual type    2022 Right Excisional Breast Biopsy - complex sclerosing lesion/radial scar; no evidence of malignancy or atypia.  Changes consistent with prior biopsy site.  Focal fibrocystic changes.  New margin excision reveals small fibroadenoma with sclerosing adenosis.  No atypia.    OB/GYN History:  Age at Menarche Onset: 18  Menopausal Status: postmenopausal, LMP: Patient's last menstrual period was 2016.  Hysterectomy/Oophorectomy: menopause, at age 52  Hormonal birth control (duration): 13 years  Pregnancy History:   Age at first live birth: 23  Hormone Replacement Therapy: No, none    Other:  # of breast biopsies (when and pathology results): 1, benign in   MG breast density: Category B  Prior thoracic RT: none  Genetic testing: none  Ashkenazi Restorationist descent: No    Family History:  Family History   Problem Relation Age of Onset    Diabetes Mother     Hypertension Mother     Heart disease Mother     No Known Problems Father     Hypertension Brother     Hypertension Brother     Single kidney Brother     Mental illness Brother         Patient History:  Past  Medical History:   Diagnosis Date    Anxiety     Arthritis     Complex sclerosing lesion of right breast     Depression     Fibromyalgia     GERD (gastroesophageal reflux disease)     Hyperlipidemia     Hypertension     MAYELA (obstructive sleep apnea)        Body mass index is 29.09 kg/m².     Past Surgical History:   Procedure Laterality Date    BREAST SURGERY Right      SECTION      EXCISION, MASS, BREAST, USING RADIOLOGICAL MARKER Right 2022    Procedure: EXCISION,MASS,BREAST,USING RADIOLOGICAL MARKER  Right;  Surgeon: Deya Lu MD;  Location: [facility];  Service: General;  Laterality: Right;    TUBAL LIGATION      URETERAL STENT PLACEMENT         Social History     Socioeconomic History    Marital status: Single    Years of education: 12TH   Tobacco Use    Smoking status: Never    Smokeless tobacco: Never   Substance and Sexual Activity    Alcohol use: Yes     Comment: occ    Drug use: No    Sexual activity: Never       Immunization History   Administered Date(s) Administered    COVID-19, MRNA, LN-S, PF (Pfizer) (Purple Cap) 2021, 2021, 2022       Medications/Allergies:    Current Outpatient Medications:     ALPRAZolam (XANAX) 0.5 MG tablet, , Disp: , Rfl: 2    busPIRone (BUSPAR) 5 MG Tab, Take 5 mg by mouth 2 (two) times daily., Disp: , Rfl:     dextroamphetamine-amphetamine (ADDERALL) 15 mg tablet, TAKE 1 TABLET BY MOUTH DAILY EARLY AM, Disp: , Rfl:     duloxetine (CYMBALTA) 30 MG capsule, Take 30 mg by mouth once daily. In addition to 60 mg (total 90 mg), Disp: , Rfl:     duloxetine (CYMBALTA) 60 MG capsule, Take 60 mg by mouth once daily., Disp: , Rfl:     irbesartan (AVAPRO) 300 MG tablet, Take 300 mg by mouth once daily., Disp: , Rfl:     nitrofurantoin (MACRODANTIN) 100 MG capsule, TAKE ONE CAPSULE BY MOUTH TWICE DAILY FOR 7 DAYS THEN ONCE DAILY, Disp: , Rfl:     oxybutynin (DITROPAN-XL) 5 MG TR24, TAKE 1 TABLET BY MOUTH EVERY DAY, Disp: 30 tablet, Rfl: 0    pantoprazole  (PROTONIX) 40 MG tablet, TK 1 T PO  QAM 30 MINUTES PRIOR TO BREAKFAST, Disp: , Rfl: 10    traZODone (DESYREL) 50 MG tablet, Take 25 mg by mouth every evening., Disp: , Rfl:     vitamin D (VITAMIN D3) 1000 units Tab, Take 1 tablet by mouth every morning., Disp: , Rfl:     MYRBETRIQ 25 mg Tb24 ER tablet, Take 25 mg by mouth., Disp: , Rfl:      Review of patient's allergies indicates:   Allergen Reactions    Diclofenac Anaphylaxis    Amlodipine Hives       Review of Systems:  Review of Systems   Constitutional:  Positive for malaise/fatigue. Negative for chills, fever and weight loss.        Chronic, fibromyalgia     HENT:  Negative for congestion and sore throat.    Eyes:  Negative for blurred vision and double vision.   Respiratory:  Negative for cough, hemoptysis, shortness of breath and wheezing.    Cardiovascular:  Negative for chest pain, palpitations and leg swelling.   Gastrointestinal:  Positive for heartburn. Negative for abdominal pain, constipation, diarrhea, nausea and vomiting.   Genitourinary:  Positive for frequency. Negative for dysuria and hematuria.        Chronic - possible interstitial cystitis vs OAB   Musculoskeletal:  Positive for joint pain. Negative for falls and myalgias.        Chronic, fibromyalgia   Skin:  Negative for itching and rash.   Neurological:  Negative for dizziness, sensory change, focal weakness, seizures and headaches.   Endo/Heme/Allergies:  Negative for environmental allergies. Does not bruise/bleed easily.   Psychiatric/Behavioral:  Positive for depression. The patient is nervous/anxious.           Objective:     Vitals:  Vitals:    10/17/23 0815   BP: (!) 153/77   Pulse: 71   Resp: 18   Temp: 98 °F (36.7 °C)         Body mass index is 29.09 kg/m².    Physical Exam:  General: The patient is awake, alert and oriented times three. The patient is well nourished and in no acute distress.  Neck: There is no evidence of palpable cervical, supraclavicular or axillary adenopathy.  The neck is supple. The thyroid is not enlarged.  Musculoskeletal: The patient has a normal range of motion of her bilateral upper extremities.  Chest: Examination of the chest wall fails to reveal any obvious abnormalities. Nonlabored breathing, symmetric expansion.  Breast:  Right:  Well healed excisional biopsy scar. Examination of right breast fails to reveal any dominant masses or areas of significant focal nodularity. The nipple is everted without evidence of discharge. There is no skin dimpling with movement of the pectoralis. There are no significant skin changes overlying the breast.   Left:  Examination of the left breast fails to reveal any dominant masses or areas of significant focal nodularity. The nipple is everted without evidence of discharge. There is no skin dimpling with movement of the pectoralis. There are no significant skin changes overlying the breast.  Abdomen: The abdomen is soft, flat, nontender and nondistended.  Integumentary: no rashes or skin lesions present  Neurologic: cranial nerves intact, no signs of peripheral neurological deficit, motor/sensory function intact    Assessment and Plan:         Patient Active Problem List   Diagnosis    OAB (overactive bladder)    Hydronephrosis, right    Fibromyalgia    Anxiety        Elise was seen today for follow-up.    Diagnoses and all orders for this visit:    Complex sclerosing lesion of right breast               Plan:     Continue annual screening, next due 9/2024. RTC as needed. Will release patient back to referring provider for future breast screening exams.    Healthy lifestyle guidelines were reviewed. She was encouraged to engage in regular exercise, maintain a healthy body weight, and avoid excessive alcohol consumption. Healthy nutritional guidelines were also discussed. Self-breast examination was reviewed with the patient in detail and she was encouraged to perform this on a monthly basis.    CC: Dr. Ilda Alexis      All of  her questions were answered. She was advised to call if she develops any questions or concerns.    Nory Brito PA-C        --------------------------------------------------------------------------------------------------------------  Total time on the date of the visit ranged from 20-29 mins (24144). Total time includes both face-to-face and non-face-to-face time personally spent by myself on the day of the visit.    Non-face-to-face time included:  _X_ preparing to see the patient such as reviewing the patient record  __ obtaining and reviewing separately obtained history  _X_ independently interpreting results  _X_ documenting clinical information in electronic health record.    Face-to-face time included:  _X_ performing an appropriate history and examination  _X_ communicating results to the patient  _X_ counseling and educating the patient  __ ordering appropriate medications  _x_ ordering appropriate tests  _X_ ordering appropriate procedures (including follow-up)  _X_ answering any questions the patient had    Total Time spent on date of visit: 25 minutes

## 2023-10-17 ENCOUNTER — OFFICE VISIT (OUTPATIENT)
Dept: SURGERY | Facility: CLINIC | Age: 58
End: 2023-10-17
Payer: MEDICARE

## 2023-10-17 VITALS
DIASTOLIC BLOOD PRESSURE: 77 MMHG | WEIGHT: 164.19 LBS | RESPIRATION RATE: 18 BRPM | HEIGHT: 63 IN | SYSTOLIC BLOOD PRESSURE: 153 MMHG | BODY MASS INDEX: 29.09 KG/M2 | HEART RATE: 71 BPM | OXYGEN SATURATION: 98 % | TEMPERATURE: 98 F

## 2023-10-17 DIAGNOSIS — N64.89 COMPLEX SCLEROSING LESION OF RIGHT BREAST: Primary | ICD-10-CM

## 2023-10-17 PROCEDURE — 3078F PR MOST RECENT DIASTOLIC BLOOD PRESSURE < 80 MM HG: ICD-10-PCS | Mod: CPTII,S$GLB,, | Performed by: PHYSICIAN ASSISTANT

## 2023-10-17 PROCEDURE — 1160F RVW MEDS BY RX/DR IN RCRD: CPT | Mod: CPTII,S$GLB,, | Performed by: PHYSICIAN ASSISTANT

## 2023-10-17 PROCEDURE — 3077F PR MOST RECENT SYSTOLIC BLOOD PRESSURE >= 140 MM HG: ICD-10-PCS | Mod: CPTII,S$GLB,, | Performed by: PHYSICIAN ASSISTANT

## 2023-10-17 PROCEDURE — 1159F MED LIST DOCD IN RCRD: CPT | Mod: CPTII,S$GLB,, | Performed by: PHYSICIAN ASSISTANT

## 2023-10-17 PROCEDURE — 4010F PR ACE/ARB THEARPY RXD/TAKEN: ICD-10-PCS | Mod: CPTII,S$GLB,, | Performed by: PHYSICIAN ASSISTANT

## 2023-10-17 PROCEDURE — 1159F PR MEDICATION LIST DOCUMENTED IN MEDICAL RECORD: ICD-10-PCS | Mod: CPTII,S$GLB,, | Performed by: PHYSICIAN ASSISTANT

## 2023-10-17 PROCEDURE — 3008F BODY MASS INDEX DOCD: CPT | Mod: CPTII,S$GLB,, | Performed by: PHYSICIAN ASSISTANT

## 2023-10-17 PROCEDURE — 3008F PR BODY MASS INDEX (BMI) DOCUMENTED: ICD-10-PCS | Mod: CPTII,S$GLB,, | Performed by: PHYSICIAN ASSISTANT

## 2023-10-17 PROCEDURE — 3077F SYST BP >= 140 MM HG: CPT | Mod: CPTII,S$GLB,, | Performed by: PHYSICIAN ASSISTANT

## 2023-10-17 PROCEDURE — 99999 PR PBB SHADOW E&M-EST. PATIENT-LVL IV: ICD-10-PCS | Mod: PBBFAC,,, | Performed by: PHYSICIAN ASSISTANT

## 2023-10-17 PROCEDURE — 1160F PR REVIEW ALL MEDS BY PRESCRIBER/CLIN PHARMACIST DOCUMENTED: ICD-10-PCS | Mod: CPTII,S$GLB,, | Performed by: PHYSICIAN ASSISTANT

## 2023-10-17 PROCEDURE — 3078F DIAST BP <80 MM HG: CPT | Mod: CPTII,S$GLB,, | Performed by: PHYSICIAN ASSISTANT

## 2023-10-17 PROCEDURE — 99213 OFFICE O/P EST LOW 20 MIN: CPT | Mod: S$GLB,,, | Performed by: PHYSICIAN ASSISTANT

## 2023-10-17 PROCEDURE — 99999 PR PBB SHADOW E&M-EST. PATIENT-LVL IV: CPT | Mod: PBBFAC,,, | Performed by: PHYSICIAN ASSISTANT

## 2023-10-17 PROCEDURE — 99213 PR OFFICE/OUTPT VISIT, EST, LEVL III, 20-29 MIN: ICD-10-PCS | Mod: S$GLB,,, | Performed by: PHYSICIAN ASSISTANT

## 2023-10-17 PROCEDURE — 4010F ACE/ARB THERAPY RXD/TAKEN: CPT | Mod: CPTII,S$GLB,, | Performed by: PHYSICIAN ASSISTANT

## 2023-10-17 RX ORDER — MIRABEGRON 25 MG/1
25 TABLET, FILM COATED, EXTENDED RELEASE ORAL
COMMUNITY
Start: 2023-05-11

## 2023-10-17 RX ORDER — DEXTROAMPHETAMINE SACCHARATE, AMPHETAMINE ASPARTATE, DEXTROAMPHETAMINE SULFATE AND AMPHETAMINE SULFATE 3.75; 3.75; 3.75; 3.75 MG/1; MG/1; MG/1; MG/1
TABLET ORAL
COMMUNITY
Start: 2023-09-25

## 2023-10-17 RX ORDER — CHOLECALCIFEROL (VITAMIN D3) 25 MCG
1 TABLET ORAL EVERY MORNING
COMMUNITY

## 2023-10-17 RX ORDER — NITROFURANTOIN (MACROCRYSTALS) 100 MG/1
CAPSULE ORAL
COMMUNITY
Start: 2023-08-10

## (undated) DEVICE — ELECTRODE PATIENT RETURN DISP

## (undated) DEVICE — GOWN X-LG STERILE BACK

## (undated) DEVICE — SURGICAL BRA

## (undated) DEVICE — ADHESIVE DERMABOND ADVANCED

## (undated) DEVICE — DRESSING TRANS 4X4 TEGADERM

## (undated) DEVICE — NDL HYPO REG 25G X 1 1/2

## (undated) DEVICE — ILLUMINATOR PHOTONBLADE 8/11IN

## (undated) DEVICE — SPONGE LAP STRL 18X18IN

## (undated) DEVICE — Device

## (undated) DEVICE — GLOVE 6.0 PROTEXIS PI MICRO

## (undated) DEVICE — GLOVE PROTEXIS PI SYN SURG 6.5

## (undated) DEVICE — NDL SYR 10ML 18X1.5 LL BLUNT

## (undated) DEVICE — GAUZE SPONGE 4X4 12PLY

## (undated) DEVICE — SUT STRATAFIX 4-0 30CM PS-2

## (undated) DEVICE — SOL IRRI STRL WATER 1000ML

## (undated) DEVICE — SUPPORT ULNA NERVE PROTECTOR

## (undated) DEVICE — DRESSING TRANS 4X4 3/4

## (undated) DEVICE — SUT MCRYL PLUS 3-0 PS2 27IN

## (undated) DEVICE — COVER PROBE WITH BAND 6X96IN

## (undated) DEVICE — CLOSURE SKIN STERI STRIP 1/2X4

## (undated) DEVICE — GLOVE PROTEXIS PI SYN SURG 6.0

## (undated) DEVICE — SUT 2/0 30IN SILK BLK BRAI

## (undated) DEVICE — DRAPE FLUID WARMER ORS 44X44IN

## (undated) DEVICE — GLOVE PROTEXIS BLUE LATEX 7